# Patient Record
Sex: FEMALE | Race: WHITE | Employment: OTHER | URBAN - METROPOLITAN AREA
[De-identification: names, ages, dates, MRNs, and addresses within clinical notes are randomized per-mention and may not be internally consistent; named-entity substitution may affect disease eponyms.]

---

## 2017-11-07 ENCOUNTER — HOSPITAL ENCOUNTER (OUTPATIENT)
Dept: MAMMOGRAPHY | Age: 67
Discharge: HOME | End: 2017-11-07
Attending: INTERNAL MEDICINE | Admitting: INTERNAL MEDICINE

## 2017-11-07 DIAGNOSIS — Z12.31 VISIT FOR SCREENING MAMMOGRAM: ICD-10-CM

## 2018-11-14 ENCOUNTER — HOSPITAL ENCOUNTER (OUTPATIENT)
Dept: MAMMOGRAPHY | Age: 68
Discharge: HOME | End: 2018-11-14
Payer: MEDICARE

## 2018-11-14 DIAGNOSIS — Z12.31 VISIT FOR SCREENING MAMMOGRAM: ICD-10-CM

## 2018-11-14 PROCEDURE — 77063 BREAST TOMOSYNTHESIS BI: CPT

## 2019-11-19 ENCOUNTER — HOSPITAL ENCOUNTER (OUTPATIENT)
Dept: MAMMOGRAPHY | Age: 69
Discharge: HOME | End: 2019-11-19
Payer: MEDICARE

## 2019-11-19 DIAGNOSIS — Z12.31 VISIT FOR SCREENING MAMMOGRAM: ICD-10-CM

## 2019-11-19 PROCEDURE — 77063 BREAST TOMOSYNTHESIS BI: CPT

## 2021-05-27 ENCOUNTER — HOSPITAL ENCOUNTER (OUTPATIENT)
Dept: MAMMOGRAPHY | Age: 71
Discharge: HOME | End: 2021-05-27
Payer: MEDICARE

## 2021-05-27 VITALS — WEIGHT: 165 LBS | HEIGHT: 62 IN | BODY MASS INDEX: 30.36 KG/M2

## 2021-05-27 DIAGNOSIS — Z12.31 VISIT FOR SCREENING MAMMOGRAM: ICD-10-CM

## 2021-05-27 PROCEDURE — 77063 BREAST TOMOSYNTHESIS BI: CPT

## 2022-06-06 ENCOUNTER — HOSPITAL ENCOUNTER (OUTPATIENT)
Dept: MAMMOGRAPHY | Age: 72
Discharge: HOME | End: 2022-06-06
Payer: MEDICARE

## 2022-06-06 VITALS — WEIGHT: 165 LBS | BODY MASS INDEX: 30.36 KG/M2 | HEIGHT: 62 IN

## 2022-06-06 DIAGNOSIS — Z12.31 VISIT FOR SCREENING MAMMOGRAM: ICD-10-CM

## 2022-06-06 PROCEDURE — 77063 BREAST TOMOSYNTHESIS BI: CPT

## 2024-04-28 ENCOUNTER — OFFICE VISIT (OUTPATIENT)
Dept: URGENT CARE | Facility: CLINIC | Age: 74
End: 2024-04-28
Payer: COMMERCIAL

## 2024-04-28 VITALS
HEART RATE: 82 BPM | SYSTOLIC BLOOD PRESSURE: 122 MMHG | RESPIRATION RATE: 18 BRPM | WEIGHT: 180 LBS | BODY MASS INDEX: 33.13 KG/M2 | HEIGHT: 62 IN | TEMPERATURE: 98 F | DIASTOLIC BLOOD PRESSURE: 81 MMHG | OXYGEN SATURATION: 96 %

## 2024-04-28 DIAGNOSIS — S99.912A ANKLE INJURY, LEFT, INITIAL ENCOUNTER: Primary | ICD-10-CM

## 2024-04-28 DIAGNOSIS — S82.832A CLOSED FRACTURE OF DISTAL END OF LEFT FIBULA, UNSPECIFIED FRACTURE MORPHOLOGY, INITIAL ENCOUNTER: ICD-10-CM

## 2024-04-28 DIAGNOSIS — S92.356A CLOSED NONDISPLACED FRACTURE OF FIFTH METATARSAL BONE, UNSPECIFIED LATERALITY, INITIAL ENCOUNTER: ICD-10-CM

## 2024-04-28 PROCEDURE — 99213 OFFICE O/P EST LOW 20 MIN: CPT | Mod: S$GLB,,, | Performed by: NURSE PRACTITIONER

## 2024-04-28 PROCEDURE — 73610 X-RAY EXAM OF ANKLE: CPT | Mod: LT,S$GLB,, | Performed by: RADIOLOGY

## 2024-04-28 RX ORDER — LEVOTHYROXINE SODIUM 50 UG/1
50 TABLET ORAL
COMMUNITY

## 2024-04-28 RX ORDER — OMEPRAZOLE 40 MG/1
40 CAPSULE, DELAYED RELEASE ORAL
COMMUNITY

## 2024-04-28 RX ORDER — ACETAMINOPHEN 500 MG
2000 TABLET ORAL
COMMUNITY

## 2024-04-28 RX ORDER — TRIAMTERENE/HYDROCHLOROTHIAZID 37.5-25 MG
1 TABLET ORAL
COMMUNITY

## 2024-04-28 RX ORDER — POTASSIUM CHLORIDE 750 MG/1
10 TABLET, EXTENDED RELEASE ORAL
COMMUNITY

## 2024-04-28 RX ORDER — ATORVASTATIN CALCIUM 20 MG/1
1 TABLET, FILM COATED ORAL NIGHTLY
COMMUNITY
Start: 2024-04-01

## 2024-04-28 NOTE — PROGRESS NOTES
"Subjective:      Patient ID: Radha Miranda is a 73 y.o. female.    Vitals:  height is 5' 2" (1.575 m) and weight is 81.6 kg (180 lb). Her oral temperature is 98.2 °F (36.8 °C). Her blood pressure is 122/81 and her pulse is 82. Her respiration is 18 and oxygen saturation is 96%.     Chief Complaint: Ankle Pain    Patient presents c.o. left ankle pain.Symps include an aching pain.Symps began today    Ankle Pain   The incident occurred 1 to 3 hours ago. [place]. The injury mechanism was a fall. The pain is present in the left ankle. The pain is at a severity of 4/10. The pain is mild. The pain has been Constant since onset. Associated symptoms include an inability to bear weight. Pertinent negatives include no loss of motion, loss of sensation, muscle weakness, numbness or tingling. She reports no foreign bodies present. The symptoms are aggravated by movement and weight bearing. She has tried ice for the symptoms. The treatment provided mild relief.       Constitution: Negative for fatigue and fever.   Neck: neck negative.   Cardiovascular: Negative.    Respiratory: Negative.     Musculoskeletal:  Positive for joint pain.   Neurological:  Negative for numbness.      Objective:     Physical Exam   HENT:   Head: Normocephalic.   Pulmonary/Chest: Effort normal and breath sounds normal.   Abdominal: Normal appearance.   Musculoskeletal:         General: Tenderness present. No swelling or deformity.      Comments: + limited flexion of L ankle with pain   Neurological: She is alert.   Skin: Skin is warm and dry.       Assessment:     1. Ankle injury, left, initial encounter    2. Closed fracture of distal end of left fibula, unspecified fracture morphology, initial encounter    3. Closed nondisplaced fracture of fifth metatarsal bone, unspecified laterality, initial encounter        Plan:    presents for L ankle pain. States while she walking, she stepped off a curbed onset this " afternoon.     Xray Ankle-  1. Fracture involving the distal aspect of the fibula as described.  2. There is fracture involving the base of the 5th metatarsal  Ankle injury, left, initial encounter  -     XR ANKLE COMPLETE 3 VIEW LEFT; Future; Expected date: 04/28/2024    Closed fracture of distal end of left fibula, unspecified fracture morphology, initial encounter  -     Ambulatory referral/consult to Orthopedics  -     POST-SURGICAL BOOT/SHOE FOR HOME USE  -     CRUTCHES FOR HOME USE    Closed nondisplaced fracture of fifth metatarsal bone, unspecified laterality, initial encounter  -     Ambulatory referral/consult to Orthopedics  -     POST-SURGICAL BOOT/SHOE FOR HOME USE  -     CRUTCHES FOR HOME USE      Patient Instructions   Rest your ankle. You can use crutches to help keep the weight off of your foot.  Place an ice pack or a bag of frozen vegetables wrapped in a towel over the painful part. Never put ice right on the skin. Use ice every 1 to 2 hours for 10 to 15 minutes at a time. Use for the first 24 to 48 hours after your injury.  Wear your splint, brace, or cast. Follow the doctors orders about when to put weight on your foot and how much.  Prop your ankle on pillows, keeping your foot raised above the level of your heart. This may help lessen pain and swelling.  If you smoke, try to quit. Broken bones take longer to heal if you smoke.  Wiggle your toes often. This helps blood flow.

## 2024-04-28 NOTE — PATIENT INSTRUCTIONS
Rest your ankle. You can use crutches to help keep the weight off of your foot.  Place an ice pack or a bag of frozen vegetables wrapped in a towel over the painful part. Never put ice right on the skin. Use ice every 1 to 2 hours for 10 to 15 minutes at a time. Use for the first 24 to 48 hours after your injury.  Wear your splint, brace, or cast. Follow the doctors orders about when to put weight on your foot and how much.  Prop your ankle on pillows, keeping your foot raised above the level of your heart. This may help lessen pain and swelling.  If you smoke, try to quit. Broken bones take longer to heal if you smoke.  Wiggle your toes often. This helps blood flow.

## 2024-04-30 ENCOUNTER — OFFICE VISIT (OUTPATIENT)
Dept: ORTHOPEDICS | Facility: CLINIC | Age: 74
End: 2024-04-30
Payer: COMMERCIAL

## 2024-04-30 VITALS — BODY MASS INDEX: 33.1 KG/M2 | WEIGHT: 179.88 LBS | HEIGHT: 62 IN

## 2024-04-30 DIAGNOSIS — S82.832A OTHER CLOSED FRACTURE OF DISTAL END OF LEFT FIBULA, INITIAL ENCOUNTER: Primary | ICD-10-CM

## 2024-04-30 DIAGNOSIS — S92.355A CLOSED NONDISPLACED FRACTURE OF FIFTH METATARSAL BONE OF LEFT FOOT, INITIAL ENCOUNTER: ICD-10-CM

## 2024-04-30 PROCEDURE — 99999 PR PBB SHADOW E&M-EST. PATIENT-LVL III: CPT | Mod: PBBFAC,,, | Performed by: NURSE PRACTITIONER

## 2024-04-30 PROCEDURE — 1159F MED LIST DOCD IN RCRD: CPT | Mod: CPTII,S$GLB,, | Performed by: NURSE PRACTITIONER

## 2024-04-30 PROCEDURE — 3288F FALL RISK ASSESSMENT DOCD: CPT | Mod: CPTII,S$GLB,, | Performed by: NURSE PRACTITIONER

## 2024-04-30 PROCEDURE — 1126F AMNT PAIN NOTED NONE PRSNT: CPT | Mod: CPTII,S$GLB,, | Performed by: NURSE PRACTITIONER

## 2024-04-30 PROCEDURE — 99203 OFFICE O/P NEW LOW 30 MIN: CPT | Mod: S$GLB,,, | Performed by: NURSE PRACTITIONER

## 2024-04-30 PROCEDURE — 1160F RVW MEDS BY RX/DR IN RCRD: CPT | Mod: CPTII,S$GLB,, | Performed by: NURSE PRACTITIONER

## 2024-04-30 PROCEDURE — 1101F PT FALLS ASSESS-DOCD LE1/YR: CPT | Mod: CPTII,S$GLB,, | Performed by: NURSE PRACTITIONER

## 2024-04-30 NOTE — PROGRESS NOTES
SUBJECTIVE:     Chief Complaint & History of Present Illness:  Radha Miranda is a New 73 y.o. year old female patient here for constant left foot/ankle pain which has been present for 3 days.  There is a history of injury.  She reports she tripped and fell thus twisting her ankle while walking into [employer].  She went to the medical tent who referred her to urgent care.  She went to urgent care and had xray of her ankle.  She was diagnosed with a distal fibula fracture and a 5th MT fracture.  She was placed into a short cam boot and referred to Orthopedics.    Currently, she reports she has intermittent pain in her foot/ankle especially when she walks without her boot.  She denies any prior injuries to her foot/ankle.  She denies any foot/toe numbness.  She is from [place], [place] and is planning to return home on Friday.  She has been using Aleve for pain with good control.    Review of patient's allergies indicates:  No Known Allergies      Current Outpatient Medications   Medication Sig Dispense Refill    atorvastatin (LIPITOR) 20 MG tablet Take 1 tablet by mouth every evening.      cholecalciferol, vitamin D3, (VITAMIN D3) 50 mcg (2,000 unit) Cap capsule Take 2,000 Units by mouth.      levothyroxine (SYNTHROID) 50 MCG tablet Take 50 mcg by mouth.      omeprazole (PRILOSEC) 40 MG capsule Take 40 mg by mouth.      potassium chloride (KLOR-CON) 10 MEQ TbSR Take 10 mEq by mouth.      triamterene-hydrochlorothiazide 37.5-25 mg (MAXZIDE-25) 37.5-25 mg per tablet Take 1 tablet by mouth.       No current facility-administered medications for this visit.       No past medical history on file.    No past surgical history on file.    No family history on file.      Review of Systems:  ROS:  Constitutional: no fever or chills  Eyes: no visual changes  ENT: no nasal congestion or sore throat  Respiratory: no cough or shortness of breath  Cardiovascular: no chest pain or palpitations  Gastrointestinal: no nausea or  "vomiting, tolerating diet  Genitourinary: no hematuria or dysuria  Integument/Breast: no rash or pruritis  Hematologic/Lymphatic: no easy bruising or lymphadenopathy  Musculoskeletal: no arthralgias or myalgias  Neurological: no seizures or tremors  Behavioral/Psych: no auditory or visual hallucinations  Endocrine: no heat or cold intolerance      OBJECTIVE:     PHYSICAL EXAM:  Vital Signs (Most Recent)  There were no vitals filed for this visit.     ,   Estimated body mass index is 32.92 kg/m² as calculated from the following:    Height as of 4/28/24: 5' 2" (1.575 m).    Weight as of 4/28/24: 81.6 kg (180 lb).   General Appearance: Well nourished, well developed, in no acute distress.  HENT: Normal cephalic, oropharynx pink and moist  Eyes: PERRLA bilaterally and EOM x 4  Respiratory: Even and unlabored  Skin: Warm and Dry.   Psychiatric: AAO x 4, Mood & affect are normal.    left  Foot/Ankle    General appearance: no acute distress, alert/oriented x3, appropriate mood and affect, looks stated age, and well nourished  The examination was performed out of splint/cast  Skin: bruising  Swelling: minimal  Warmth: no warmth  Tenderness: diffuse  ROM: 35 degrees dorsiflexion, 30 degrees plantarflexion, 25 degrees inversion, and 20 degrees eversion  Strength: 4 on 5  Gait: antalgic  Stability: stable to testing and Cotton test: negative  Crepitus: no  Neurological Exam: normal  Vascular Exam: normal and pulse present    soft tissue swelling noted over the lateral ankle      RADIOGRAPHS:  Left ankle x-ray dated April 28, 2024 shows a distal fibular fracture consistent with a Barrientos A in a nondisplaced 5th metatarsal fracture.  No other fractures noted.  Ankle mortise is symmetrical.  All radiographs were personally reviewed by me.    ASSESSMENT/PLAN:       ICD-10-CM ICD-9-CM   1. Other closed fracture of distal end of left fibula, initial encounter  S82.832A 824.8   2. Closed nondisplaced fracture of fifth metatarsal bone " of left foot, initial encounter  S92.355A 825.25       Plan:  -Radha Miranda presents to clinic today with c/c left foot/ankle pain for the past 3 days secondary to a twisting injury on April 28, 2024.  Patient went to urgent care where x-rays were performed which showed a distal fibular fracture and proximal 5th metatarsal fracture.  -X-ray as above.  -Recommend RICE therapy.    I will switch the patient into a tall cam boot.  She is planning to return home to [place] at the end of the week.  I recommend she take a baby aspirin twice a day to day before, day of, day after her travel to prevent DVTs.  She will continue to use over-the-counter analgesics p.r.n..  She can weight bear as tolerated.  Come out of the boot and perform gentle range of motion as tolerated.  I recommend upon return home she follow-up with an Orthopedist in 1-2 weeks for repeat imaging.

## 2024-08-09 ENCOUNTER — HOSPITAL ENCOUNTER (OUTPATIENT)
Dept: MAMMOGRAPHY | Age: 74
Discharge: HOME | End: 2024-08-09
Payer: MEDICARE

## 2024-08-09 DIAGNOSIS — Z12.31 VISIT FOR SCREENING MAMMOGRAM: ICD-10-CM

## 2024-08-09 PROCEDURE — 77063 BREAST TOMOSYNTHESIS BI: CPT

## 2025-08-12 ENCOUNTER — HOSPITAL ENCOUNTER (OUTPATIENT)
Dept: MAMMOGRAPHY | Age: 75
Discharge: HOME | End: 2025-08-12
Payer: MEDICARE

## 2025-08-12 VITALS — BODY MASS INDEX: 33.13 KG/M2 | WEIGHT: 180 LBS | HEIGHT: 62 IN

## 2025-08-12 DIAGNOSIS — Z12.31 VISIT FOR SCREENING MAMMOGRAM: ICD-10-CM

## 2025-08-12 PROCEDURE — 77063 BREAST TOMOSYNTHESIS BI: CPT
